# Patient Record
Sex: MALE | Race: BLACK OR AFRICAN AMERICAN | Employment: FULL TIME | URBAN - METROPOLITAN AREA
[De-identification: names, ages, dates, MRNs, and addresses within clinical notes are randomized per-mention and may not be internally consistent; named-entity substitution may affect disease eponyms.]

---

## 2023-06-01 ENCOUNTER — HOSPITAL ENCOUNTER (INPATIENT)
Age: 53
LOS: 1 days | Discharge: HOME | End: 2023-06-03
Attending: EMERGENCY MEDICINE | Admitting: PSYCHIATRY & NEUROLOGY

## 2023-06-01 DIAGNOSIS — R42 LIGHTHEADEDNESS: ICD-10-CM

## 2023-06-01 DIAGNOSIS — G81.94 ACUTE LEFT HEMIPARESIS (HCC): ICD-10-CM

## 2023-06-01 DIAGNOSIS — R53.1 WEAKNESS OF LEFT SIDE OF BODY: Primary | ICD-10-CM

## 2023-06-01 DIAGNOSIS — I63.50 RIGHT PONTINE STROKE (HCC): ICD-10-CM

## 2023-06-01 LAB — TROPONIN I SERPL HS-MCNC: 8 NG/L (ref 0–22)

## 2023-06-01 PROCEDURE — 99285 EMERGENCY DEPT VISIT HI MDM: CPT

## 2023-06-01 PROCEDURE — 93005 ELECTROCARDIOGRAM TRACING: CPT | Performed by: PSYCHIATRY & NEUROLOGY

## 2023-06-01 PROCEDURE — 84484 ASSAY OF TROPONIN QUANT: CPT

## 2023-06-01 ASSESSMENT — PAIN - FUNCTIONAL ASSESSMENT: PAIN_FUNCTIONAL_ASSESSMENT: NONE - DENIES PAIN

## 2023-06-02 ENCOUNTER — APPOINTMENT (OUTPATIENT)
Dept: MRI IMAGING | Age: 53
End: 2023-06-02

## 2023-06-02 PROBLEM — R53.1 WEAKNESS OF LEFT SIDE OF BODY: Status: ACTIVE | Noted: 2023-06-02

## 2023-06-02 PROBLEM — I63.9 STROKE DETERMINED BY CLINICAL ASSESSMENT (HCC): Status: ACTIVE | Noted: 2023-06-02

## 2023-06-02 PROBLEM — I63.50 RIGHT PONTINE STROKE (HCC): Status: ACTIVE | Noted: 2023-06-02

## 2023-06-02 PROBLEM — G81.94 ACUTE LEFT HEMIPARESIS (HCC): Status: ACTIVE | Noted: 2023-06-02

## 2023-06-02 LAB
ANION GAP SERPL CALCULATED.3IONS-SCNC: 10 MMOL/L (ref 9–17)
BUN SERPL-MCNC: 9 MG/DL (ref 6–20)
CALCIUM SERPL-MCNC: 9 MG/DL (ref 8.6–10.4)
CHLORIDE SERPL-SCNC: 102 MMOL/L (ref 98–107)
CO2 SERPL-SCNC: 22 MMOL/L (ref 20–31)
CREAT SERPL-MCNC: 0.91 MG/DL (ref 0.7–1.2)
EKG ATRIAL RATE: 50 BPM
EKG P AXIS: 36 DEGREES
EKG P-R INTERVAL: 148 MS
EKG Q-T INTERVAL: 494 MS
EKG QRS DURATION: 106 MS
EKG QTC CALCULATION (BAZETT): 450 MS
EKG R AXIS: 11 DEGREES
EKG T AXIS: 25 DEGREES
EKG VENTRICULAR RATE: 50 BPM
ERYTHROCYTE [DISTWIDTH] IN BLOOD BY AUTOMATED COUNT: 13.1 % (ref 11.8–14.4)
EST. AVERAGE GLUCOSE BLD GHB EST-MCNC: 117 MG/DL
GFR SERPL CREATININE-BSD FRML MDRD: >60 ML/MIN/1.73M2
GLUCOSE SERPL-MCNC: 92 MG/DL (ref 70–99)
HBA1C MFR BLD: 5.7 % (ref 4–6)
HCT VFR BLD AUTO: 42.8 % (ref 40.7–50.3)
HGB BLD-MCNC: 14.1 G/DL (ref 13–17)
MCH RBC QN AUTO: 30.5 PG (ref 25.2–33.5)
MCHC RBC AUTO-ENTMCNC: 32.9 G/DL (ref 28.4–34.8)
MCV RBC AUTO: 92.4 FL (ref 82.6–102.9)
NRBC AUTOMATED: 0 PER 100 WBC
PLATELET # BLD AUTO: 227 K/UL (ref 138–453)
PMV BLD AUTO: 10.4 FL (ref 8.1–13.5)
POTASSIUM SERPL-SCNC: 3.9 MMOL/L (ref 3.7–5.3)
RBC # BLD AUTO: 4.63 M/UL (ref 4.21–5.77)
SODIUM SERPL-SCNC: 134 MMOL/L (ref 135–144)
TSH SERPL-MCNC: 1.6 UIU/ML (ref 0.3–5)
WBC OTHER # BLD: 7.8 K/UL (ref 3.5–11.3)

## 2023-06-02 PROCEDURE — 6370000000 HC RX 637 (ALT 250 FOR IP)

## 2023-06-02 PROCEDURE — 97166 OT EVAL MOD COMPLEX 45 MIN: CPT

## 2023-06-02 PROCEDURE — 92523 SPEECH SOUND LANG COMPREHEN: CPT

## 2023-06-02 PROCEDURE — 85027 COMPLETE CBC AUTOMATED: CPT

## 2023-06-02 PROCEDURE — 6370000000 HC RX 637 (ALT 250 FOR IP): Performed by: STUDENT IN AN ORGANIZED HEALTH CARE EDUCATION/TRAINING PROGRAM

## 2023-06-02 PROCEDURE — 80048 BASIC METABOLIC PNL TOTAL CA: CPT

## 2023-06-02 PROCEDURE — 93010 ELECTROCARDIOGRAM REPORT: CPT | Performed by: INTERNAL MEDICINE

## 2023-06-02 PROCEDURE — 99223 1ST HOSP IP/OBS HIGH 75: CPT | Performed by: PSYCHIATRY & NEUROLOGY

## 2023-06-02 PROCEDURE — 83036 HEMOGLOBIN GLYCOSYLATED A1C: CPT

## 2023-06-02 PROCEDURE — 84443 ASSAY THYROID STIM HORMONE: CPT

## 2023-06-02 PROCEDURE — 97162 PT EVAL MOD COMPLEX 30 MIN: CPT

## 2023-06-02 PROCEDURE — 36415 COLL VENOUS BLD VENIPUNCTURE: CPT

## 2023-06-02 PROCEDURE — 2060000000 HC ICU INTERMEDIATE R&B

## 2023-06-02 PROCEDURE — 94761 N-INVAS EAR/PLS OXIMETRY MLT: CPT

## 2023-06-02 PROCEDURE — 70551 MRI BRAIN STEM W/O DYE: CPT

## 2023-06-02 PROCEDURE — 97116 GAIT TRAINING THERAPY: CPT

## 2023-06-02 PROCEDURE — 97535 SELF CARE MNGMENT TRAINING: CPT

## 2023-06-02 PROCEDURE — 6360000002 HC RX W HCPCS: Performed by: STUDENT IN AN ORGANIZED HEALTH CARE EDUCATION/TRAINING PROGRAM

## 2023-06-02 PROCEDURE — 99254 IP/OBS CNSLTJ NEW/EST MOD 60: CPT | Performed by: STUDENT IN AN ORGANIZED HEALTH CARE EDUCATION/TRAINING PROGRAM

## 2023-06-02 RX ORDER — CLOPIDOGREL BISULFATE 75 MG/1
75 TABLET ORAL DAILY
Status: DISCONTINUED | OUTPATIENT
Start: 2023-06-03 | End: 2023-06-03 | Stop reason: HOSPADM

## 2023-06-02 RX ORDER — ENOXAPARIN SODIUM 100 MG/ML
40 INJECTION SUBCUTANEOUS DAILY
Status: DISCONTINUED | OUTPATIENT
Start: 2023-06-02 | End: 2023-06-03 | Stop reason: HOSPADM

## 2023-06-02 RX ORDER — ASPIRIN 300 MG/1
300 SUPPOSITORY RECTAL DAILY
Status: DISCONTINUED | OUTPATIENT
Start: 2023-06-02 | End: 2023-06-03 | Stop reason: HOSPADM

## 2023-06-02 RX ORDER — ONDANSETRON 4 MG/1
4 TABLET, ORALLY DISINTEGRATING ORAL EVERY 8 HOURS PRN
Status: DISCONTINUED | OUTPATIENT
Start: 2023-06-02 | End: 2023-06-03 | Stop reason: HOSPADM

## 2023-06-02 RX ORDER — LABETALOL HYDROCHLORIDE 5 MG/ML
10 INJECTION, SOLUTION INTRAVENOUS EVERY 10 MIN PRN
Status: DISCONTINUED | OUTPATIENT
Start: 2023-06-02 | End: 2023-06-03 | Stop reason: HOSPADM

## 2023-06-02 RX ORDER — ONDANSETRON 2 MG/ML
4 INJECTION INTRAMUSCULAR; INTRAVENOUS EVERY 6 HOURS PRN
Status: DISCONTINUED | OUTPATIENT
Start: 2023-06-02 | End: 2023-06-03 | Stop reason: HOSPADM

## 2023-06-02 RX ORDER — POLYETHYLENE GLYCOL 3350 17 G/17G
17 POWDER, FOR SOLUTION ORAL DAILY PRN
Status: DISCONTINUED | OUTPATIENT
Start: 2023-06-02 | End: 2023-06-03 | Stop reason: HOSPADM

## 2023-06-02 RX ORDER — ASPIRIN 81 MG/1
81 TABLET ORAL DAILY
Status: DISCONTINUED | OUTPATIENT
Start: 2023-06-02 | End: 2023-06-03 | Stop reason: HOSPADM

## 2023-06-02 RX ORDER — ATORVASTATIN CALCIUM 80 MG/1
80 TABLET, FILM COATED ORAL NIGHTLY
Status: DISCONTINUED | OUTPATIENT
Start: 2023-06-02 | End: 2023-06-03 | Stop reason: HOSPADM

## 2023-06-02 RX ORDER — CLOPIDOGREL 300 MG/1
300 TABLET, FILM COATED ORAL ONCE
Status: COMPLETED | OUTPATIENT
Start: 2023-06-02 | End: 2023-06-02

## 2023-06-02 RX ADMIN — CLOPIDOGREL BISULFATE 300 MG: 300 TABLET, FILM COATED ORAL at 11:43

## 2023-06-02 RX ADMIN — ATORVASTATIN CALCIUM 80 MG: 80 TABLET, FILM COATED ORAL at 01:29

## 2023-06-02 RX ADMIN — Medication 81 MG: at 10:31

## 2023-06-02 RX ADMIN — ATORVASTATIN CALCIUM 80 MG: 80 TABLET, FILM COATED ORAL at 20:55

## 2023-06-02 RX ADMIN — ENOXAPARIN SODIUM 40 MG: 40 INJECTION SUBCUTANEOUS at 10:32

## 2023-06-02 ASSESSMENT — ENCOUNTER SYMPTOMS
DOUBLE VISION: 0
ABDOMINAL PAIN: 0
BLURRED VISION: 0
SHORTNESS OF BREATH: 0

## 2023-06-02 NOTE — ED PROVIDER NOTES
Faculty Sign-Out Attestation  Handoff taken on the following patient from prior Attending Physician: Camilo Miller was available and discussed any additional care issues that arose and coordinated the management plans with the resident(s) caring for the patient during my duty period. Any areas of disagreement with residents documentation of care or procedures are noted on the chart. I was personally present for the key portions of any/all procedures during my duty period. I have documented in the chart those procedures where I was not present during the key portions.     Stable ct / cta at [address] [facility]  > neuro at bedside,   Stroke consult, lkw 1230,   Will admit    Tom Gary DO  Attending Physician       Tom Gary DO  06/01/23 1148
[facility]     Emergency Department     Faculty Note/ Attestation      Pt Name: Ike Garcia                                       MRN: [id number]  [place] 1970  Date of evaluation: 6/1/2023    Patients PCP:    No primary care provider on file. Attestation  I performed a history and physical examination of the patient and discussed management with the resident. I reviewed the residents note and agree with the documented findings and plan of care. Any areas of disagreement are noted on the chart. I was personally present for the key portions of any procedures. I have documented in the chart those procedures where I was not present during the key portions. I have reviewed the emergency nurses triage note. I agree with the chief complaint, past medical history, past surgical history, allergies, medications, social and family history as documented unless otherwise noted below. For Physician Assistant/ Nurse Practitioner cases/documentation I have personally evaluated this patient and have completed at least one if not all key elements of the E/M (history, physical exam, and MDM). Additional findings are as noted.       Initial Screens:        Jovita Coma Scale  Eye Opening: Spontaneous  Best Verbal Response: Oriented  Best Motor Response: Obeys commands  Ionia Coma Scale Score: 15    Vitals:    Vitals:    06/01/23 2236   BP: (!) 193/113   Pulse: 54   Resp: 19   Temp: 97.6 °F (36.4 °C)   TempSrc: Oral   SpO2: 95%   Weight: 190 lb (86.2 kg)   Height: 5' 10\" (1.778 m)       CHIEF COMPLAINT       Chief Complaint   Patient presents with    Dizziness    Hypertension             DIAGNOSTIC RESULTS             RADIOLOGY:   No orders to display         LABS:  Labs Reviewed   TROPONIN         EMERGENCY DEPARTMENT COURSE:     -------------------------  BP: (!) 193/113, Temp: 97.6 °F (36.4 °C), Pulse: 54, Respirations: 19      Comments    LKW 1230 today  Tsf from OSH  Lt headed, L sided
hit bed  6b. Motor right le - no drift; leg holds 30 degree position for full 5 seconds  7. Limb Ataxia:  1 - present in one limb  8. Sensory:  0 - normal; no sensory loss  9. Best Language:  0 - no aphasia, normal  10. Dysarthria:  0 - normal  11. Extinction and Inattention:  0 - no abnormality    TOTAL:  3       PROCEDURES:  None    CONSULTS:  IP CONSULT TO STROKE TEAM  IP CONSULT TO PHYSICAL MEDICINE REHAB      FINAL IMPRESSION      1. Weakness of left side of body    2. Lightheadedness          DISPOSITION / PLAN     DISPOSITION Admitted 2023 12:17:10 AM      PATIENT REFERRED TO:  No follow-up provider specified.     DISCHARGE MEDICATIONS:  New Prescriptions    No medications on file       John aErly MD  Emergency Medicine Resident    (Please note that portions of thisnote were completed with a voice recognition program.  Efforts were made to edit the dictations but occasionally words are mis-transcribed.)       Mellissa Cage MD  Resident  [phone]

## 2023-06-02 NOTE — CARE COORDINATION
Case Management Assessment  Initial Evaluation    Date/Time of Evaluation: 6/2/2023 11:57 AM  Assessment Completed by: Moi Leon RN    If patient is discharged prior to next notation, then this note serves as note for discharge by case management. Patient Name: Farrah Purvis                   YOB: 1970  Diagnosis: Lightheadedness [R42]  Weakness of left side of body [R53.1]  Stroke determined by clinical assessment [facility]) [I63.9]                   Date / Time: 6/1/2023 10:35 PM    Patient Admission Status: Inpatient   Readmission Risk (Low < 19, Mod (19-27), High > 27): Readmission Risk Score: 6.7    Current PCP: No primary care provider on file. PCP verified by CM? No (pt does not have PCP, states he will look for one back home in [place].)    Chart Reviewed: Yes      History Provided by: Patient  Patient Orientation: Alert and Oriented    Patient Cognition: Alert    Hospitalization in the last 30 days (Readmission):  No    If yes, Readmission Assessment in CM Navigator will be completed. Advance Directives:      Code Status: Full Code   Patient's Primary Decision Maker is: Legal Next of Kin      Discharge Planning:    Patient lives with: (P) Spouse/Significant Other Type of Home: (P) House  Primary Care Giver: Self  Patient Support Systems include: Spouse/Significant Other   Current Financial resources:    Current community resources:    Current services prior to admission: (P) None            Current DME:              Type of Home Care services:  (P) None    ADLS  Prior functional level: Independent in ADLs/IADLs  Current functional level: Assistance with the following:, Mobility    PT AM-PAC:   /24  OT AM-PAC:   /24    Family can provide assistance at DC: Yes  Would you like Case Management to discuss the discharge plan with any other family members/significant others, and if so, who?  No  Plans to Return to Present Housing: Yes  Other Identified Issues/Barriers to RETURNING to current housing:

## 2023-06-02 NOTE — CARE COORDINATION
06/02/23 0731   Readmission Assessment   Number of Days since last admission? 1-7 days  (transfer from [facility], not a readmit)   Previous Disposition Other (comment)  (transfer from [facility], not a readmit)   Who is being Interviewed Patient  (transfer from [facility], not a readmit)   What was the patient's/caregiver's perception as to why they think they needed to return back to the hospital? Other (Comment)  (transfer from [facility], not a readmit)   Did you visit your Primary Care Physician after you left the hospital, before you returned this time? No  (transfer from [facility], not a readmit)   Why weren't you able to visit your PCP? Other (Comment)  (transfer from [facility], not a readmit)   Did you see a specialist, such as Cardiac, Pulmonary, Orthopedic Physician, etc. after you left the hospital? Other (Comment)  (transfer from [facility], not a readmit)   Who advised the patient to return to the hospital? Other (Comment)  (transfer from [facility], not a readmit)   Does the patient report anything that got in the way of taking their medications? No  (transfer from [facility], not a readmit)   In our efforts to provide the best possible care to you and others like you, can you think of anything that we could have done to help you after you left the hospital the first time, so that you might not have needed to return so soon?  Other (Comment)  (transfer from [facility], not a readmit)

## 2023-06-02 NOTE — CONSULTS
Department of Neurology/Telestroke/Stroke  Resident Consult Note  Stroke Alert @ 11:00 pm  Arrival at bedside @ 11:02 pm    Reason for Consult:  ED Stroke Consult  Requesting Physician:  ED   Endovascular Neurosurgeon:     Stroke Team:  Franca Velásquez MD      History Obtained From:  patient, electronic medical record    CHIEF COMPLAINT:       Sudden onset dizziness and left sided weakness while driving     HISTORY OF PRESENT ILLNESS:       The patient is a 46 y.o. male  with H/O smoking (20 years, half a pack per day, stopped for 3 years and then resumed smoking last year), chronic HTN with medication non-compliance, experienced a sudden onset dizziness and left upper and lower extremity weakness while driving. He had to pull over because his dizziness was severe. He was evaluated via tele stroke with NIHSS of 5. CTH and CTA at outlying facility were reported as unremarkable. He was loaded with asa 325 mg then transferred here for stroke workup. LKW: 12.30 pm 6/1/2023  NIHSS: 5   Modified Sequoyah Scale: 0  CT head without contrast: unremarkable  TNK: not a candidate due to outside window   Endovascular: No LVO on CTA      PAST MEDICAL HISTORY :       Past Medical History:    No past medical history on file. Past Surgical History:    No past surgical history on file.     Social History:   Social History     Socioeconomic History    Marital status: Not on file     Spouse name: Not on file    Number of children: Not on file    Years of education: Not on file    Highest education level: Not on file   Occupational History    Not on file   Tobacco Use    Smoking status: Not on file    Smokeless tobacco: Not on file   Substance and Sexual Activity    Alcohol use: Not on file    Drug use: Not on file    Sexual activity: Not on file   Other Topics Concern    Not on file   Social History Narrative    Not on file     Social Determinants of Health     Financial Resource Strain: Not on file   Food Insecurity: Not
file.    Social History:  Lives With: Spouse  Type of Home: House  Home Layout: One level  Home Access: Stairs to enter with rails  Entrance Stairs - Number of Steps: 4  Entrance Stairs - Rails: Both  Bathroom Shower/Tub: Tub/Shower unit  Bathroom Toilet: Standard  Bathroom Accessibility: Accessible  Home Equipment: None  Has the patient had two or more falls in the past year or any fall with injury in the past year?: No  Receives Help From: Family  ADL Assistance: [address]: Independent  Homemaking Responsibilities: Yes  Ambulation Assistance: Independent  Transfer Assistance: Independent  Active : Yes  Mode of Transportation: Truck  Occupation: Full time employment  Type of Occupation:   Leisure & [address]: \"hanging out\"  Additional Comments: Pt has supportive wife at d/c  Social History     Socioeconomic History    Marital status:        Physical Exam:  BP (!) 166/96   Pulse 54   Temp 97.6 °F (36.4 °C) (Oral)   Resp 13   Ht 5' 11\" (1.803 m)   Wt 208 lb 15.9 oz (94.8 kg)   SpO2 96%   BMI 29.15 kg/m²     GEN: Well developed, well nourished, no acute distress  HEENT: NCAT. EOM grossly intact. Hearing grossly intact. Mucous membranes pink and moist.  RESP: Normal breath sounds with no wheezing, rales, or rhonchi. Respirations WNL and unlabored. CV: Regular rate and rhythm. No murmurs, rubs, or gallops. ABD: Soft, non-distended, BS+ and equal.  NEURO: Alert. Speech fluent. Sensation to light touch intact. MSK: Muscle tone and bulk are normal bilaterally. Strength 5/5 in the right upper and lower limbs. Has at least antigravity strength with left elbow flexion; strength 2/5 with left . Has at least antigravity strength with left knee extension; no movement noted with left ankle dorsiflexion. LIMBS: No edema in bilateral lower limbs. SKIN: Warm and dry with good turgor. PSYCH: Mood WNL. Affect WNL. Appropriately interactive.     Diagnostics:    CBC:

## 2023-06-02 NOTE — H&P
[id number] [facility]   [address] [place] [address]    HISTORY AND PHYSICAL EXAMINATION            Date:   6/2/2023  Patient name:  Matt Archibald  Date of admission:  6/1/2023 10:35 PM  MRN:   [id number]  Account:  [de-identified]  YOB: 1970  PCP:    No primary care provider on file. Room:   [facility]  Code Status:    Full Code    Chief Complaint:     Chief Complaint   Patient presents with    Dizziness    Hypertension    Extremity Weakness     Left sided weakness       History Obtained From:     patient, electronic medical record    History of Present Illness: The patient is a 46 y.o. male  with H/O smoking (20 years, half a pack per day, stopped for 3 years and then resumed smoking last year), chronic HTN with medication non-compliance, experienced a sudden onset dizziness and left upper and lower extremity weakness while driving. He had to pull over because his dizziness was severe. He was evaluated via tele stroke with NIHSS of 5. CTH and CTA at outlying facility were reported as unremarkable. He was loaded with asa 325 mg then transferred here for stroke workup. LKW: 12.30 pm 6/1/2023  NIHSS: 5   Modified Melvin Scale: 0  CT head without contrast: unremarkable  TNK: not a candidate due to outside window   Endovascular: No LVO on CTA     Past Medical History:     No past medical history on file. Past Surgical History:     No past surgical history on file. Medications Prior to Admission:     Prior to Admission medications    Not on File        Allergies:     Patient has no allergy information on record. Social History:     Tobacco:    has no history on file for tobacco use. Alcohol:      has no history on file for alcohol use. Drug Use:  has no history on file for drug use. Family History:     No family history on file.     Review of Systems:     ROS:  Constitutional  Negative for fever and chills    HEENT  Negative for ear discharge, ear pain,

## 2023-06-02 NOTE — ED NOTES
60-year-old male hypertension history presenting with left facial droop some left-sided weakness. 85.  Last known well noon today. Dr. Ced Arnett with neurology aware recommending ED to ED transfer. Stroke alert noncritical.  CTA may be performed prior to transfer.   Accepted by Dr Gadiel Sanchez, RN  06/01/23 5184

## 2023-06-02 NOTE — PLAN OF CARE
Problem: Discharge Planning  Goal: Discharge to home or other facility with appropriate resources  6/2/2023 1220 by Rahul Garcia RN  Outcome: Progressing  6/2/2023 0616 by Myla Schulz RN  Outcome: Progressing     Problem: Safety - Adult  Goal: Free from fall injury  6/2/2023 1220 by Rahul Garcia RN  Outcome: Progressing  6/2/2023 0616 by Myla Schulz RN  Outcome: Progressing

## 2023-06-02 NOTE — ED TRIAGE NOTES
Pt a transfer from [place] with c/o of dizziness and left sided weakness. Pt states his last known well is 12 noon. Pt states he is a  going to [facility] when he felt dizzy. Pt denies chest pain and SOB. Pt also states he has Left sided weakness on LUE and LLE. Pt states he has HTN and is on amlodipine 10 mg, but is not taking his medicine regularly. Pt took amlodipine when he started to feel dizzy.

## 2023-06-03 VITALS
DIASTOLIC BLOOD PRESSURE: 98 MMHG | HEART RATE: 50 BPM | OXYGEN SATURATION: 94 % | HEIGHT: 71 IN | BODY MASS INDEX: 29.26 KG/M2 | WEIGHT: 209 LBS | RESPIRATION RATE: 12 BRPM | SYSTOLIC BLOOD PRESSURE: 159 MMHG | TEMPERATURE: 97.7 F

## 2023-06-03 LAB
ANION GAP SERPL CALCULATED.3IONS-SCNC: 11 MMOL/L (ref 9–17)
BUN SERPL-MCNC: 14 MG/DL (ref 6–20)
CALCIUM SERPL-MCNC: 9 MG/DL (ref 8.6–10.4)
CHLORIDE SERPL-SCNC: 104 MMOL/L (ref 98–107)
CHOLEST SERPL-MCNC: 186 MG/DL
CHOLESTEROL/HDL RATIO: 4.5
CO2 SERPL-SCNC: 22 MMOL/L (ref 20–31)
CREAT SERPL-MCNC: 1 MG/DL (ref 0.7–1.2)
ERYTHROCYTE [DISTWIDTH] IN BLOOD BY AUTOMATED COUNT: 13.1 % (ref 11.8–14.4)
GFR SERPL CREATININE-BSD FRML MDRD: >60 ML/MIN/1.73M2
GLUCOSE SERPL-MCNC: 89 MG/DL (ref 70–99)
HCT VFR BLD AUTO: 44 % (ref 40.7–50.3)
HDLC SERPL-MCNC: 41 MG/DL
HGB BLD-MCNC: 14.4 G/DL (ref 13–17)
LDLC SERPL CALC-MCNC: 131 MG/DL (ref 0–130)
LV EF: 55 %
LVEF MODALITY: NORMAL
MCH RBC QN AUTO: 30.5 PG (ref 25.2–33.5)
MCHC RBC AUTO-ENTMCNC: 32.7 G/DL (ref 28.4–34.8)
MCV RBC AUTO: 93.2 FL (ref 82.6–102.9)
NRBC AUTOMATED: 0 PER 100 WBC
PLATELET # BLD AUTO: 228 K/UL (ref 138–453)
PMV BLD AUTO: 10.3 FL (ref 8.1–13.5)
POTASSIUM SERPL-SCNC: 3.9 MMOL/L (ref 3.7–5.3)
RBC # BLD AUTO: 4.72 M/UL (ref 4.21–5.77)
SODIUM SERPL-SCNC: 137 MMOL/L (ref 135–144)
TRIGL SERPL-MCNC: 71 MG/DL
WBC OTHER # BLD: 7.5 K/UL (ref 3.5–11.3)

## 2023-06-03 PROCEDURE — 85027 COMPLETE CBC AUTOMATED: CPT

## 2023-06-03 PROCEDURE — 93306 TTE W/DOPPLER COMPLETE: CPT

## 2023-06-03 PROCEDURE — 6370000000 HC RX 637 (ALT 250 FOR IP)

## 2023-06-03 PROCEDURE — 99239 HOSP IP/OBS DSCHRG MGMT >30: CPT | Performed by: PSYCHIATRY & NEUROLOGY

## 2023-06-03 PROCEDURE — 6370000000 HC RX 637 (ALT 250 FOR IP): Performed by: STUDENT IN AN ORGANIZED HEALTH CARE EDUCATION/TRAINING PROGRAM

## 2023-06-03 PROCEDURE — 80048 BASIC METABOLIC PNL TOTAL CA: CPT

## 2023-06-03 PROCEDURE — 36415 COLL VENOUS BLD VENIPUNCTURE: CPT

## 2023-06-03 PROCEDURE — 80061 LIPID PANEL: CPT

## 2023-06-03 PROCEDURE — 6360000002 HC RX W HCPCS: Performed by: STUDENT IN AN ORGANIZED HEALTH CARE EDUCATION/TRAINING PROGRAM

## 2023-06-03 RX ORDER — CLOPIDOGREL BISULFATE 75 MG/1
75 TABLET ORAL DAILY
Qty: 90 TABLET | Refills: 0 | Status: SHIPPED | OUTPATIENT
Start: 2023-06-04 | End: 2023-09-02

## 2023-06-03 RX ORDER — AMLODIPINE BESYLATE 5 MG/1
5 TABLET ORAL DAILY
Qty: 30 TABLET | Refills: 3 | Status: SHIPPED | OUTPATIENT
Start: 2023-06-03 | End: 2023-06-03 | Stop reason: SDUPTHER

## 2023-06-03 RX ORDER — AMLODIPINE BESYLATE 10 MG/1
10 TABLET ORAL DAILY
Status: DISCONTINUED | OUTPATIENT
Start: 2023-06-03 | End: 2023-06-03

## 2023-06-03 RX ORDER — CLOPIDOGREL BISULFATE 75 MG/1
75 TABLET ORAL DAILY
Qty: 20 TABLET | Refills: 0 | Status: SHIPPED | OUTPATIENT
Start: 2023-06-04 | End: 2023-06-03 | Stop reason: SDUPTHER

## 2023-06-03 RX ORDER — ATORVASTATIN CALCIUM 80 MG/1
80 TABLET, FILM COATED ORAL NIGHTLY
Qty: 30 TABLET | Refills: 3 | Status: SHIPPED | OUTPATIENT
Start: 2023-06-03

## 2023-06-03 RX ORDER — AMLODIPINE BESYLATE 10 MG/1
10 TABLET ORAL DAILY
Qty: 30 TABLET | Refills: 3 | Status: SHIPPED | OUTPATIENT
Start: 2023-06-03 | End: 2023-06-03 | Stop reason: SDUPTHER

## 2023-06-03 RX ORDER — AMLODIPINE BESYLATE 5 MG/1
5 TABLET ORAL DAILY
Status: DISCONTINUED | OUTPATIENT
Start: 2023-06-03 | End: 2023-06-03 | Stop reason: HOSPADM

## 2023-06-03 RX ORDER — AMLODIPINE BESYLATE 5 MG/1
5 TABLET ORAL DAILY
Qty: 30 TABLET | Refills: 3 | Status: SHIPPED | OUTPATIENT
Start: 2023-06-03

## 2023-06-03 RX ORDER — AMLODIPINE BESYLATE 10 MG/1
5 TABLET ORAL DAILY
Qty: 30 TABLET | Refills: 3 | Status: SHIPPED | OUTPATIENT
Start: 2023-06-03 | End: 2023-06-03 | Stop reason: HOSPADM

## 2023-06-03 RX ORDER — ASPIRIN 81 MG/1
81 TABLET ORAL DAILY
Qty: 30 TABLET | Refills: 5 | Status: SHIPPED | OUTPATIENT
Start: 2023-06-04

## 2023-06-03 RX ADMIN — Medication 81 MG: at 09:40

## 2023-06-03 RX ADMIN — ENOXAPARIN SODIUM 40 MG: 40 INJECTION SUBCUTANEOUS at 09:40

## 2023-06-03 RX ADMIN — CLOPIDOGREL BISULFATE 75 MG: 75 TABLET, FILM COATED ORAL at 09:40

## 2023-06-03 ASSESSMENT — ENCOUNTER SYMPTOMS
WHEEZING: 0
SHORTNESS OF BREATH: 0
ABDOMINAL PAIN: 0

## 2023-06-03 NOTE — PROGRESS NOTES
[facility] [address]     Emergency/Trauma Note    PATIENT NAME: Teddy Kenney    Shift date: 06/01/23  Shift day: Thursday   Shift # 2    Room # 26/26   Name: Teddy Kenney            Age: 46 y.o. Gender: male          Rastafarian: None   Place of Mormon:     Trauma/Incident type:  Stroke Consult  Admit Date & Time: 6/1/2023 10:35 PM  TRAUMA NAME: N/A    ADVANCE DIRECTIVES IN CHART? No    NAME OF DECISION MAKER: N/A    RELATIONSHIP OF DECISION MAKER TO PATIENT: N/A    PATIENT/EVENT DESCRIPTION:  Teddy Kenney is a 46 y.o. male who arrived at [de-identified].  received perfect serve for stroke consult. Pt to be admitted to 26/26. SPIRITUAL ASSESSMENT-INTERVENTION-OUTCOME:  Writer introduced self as . Patient did not appear to mind  presence and engaged in conversation. Patient appeared calm and coping when conversing with .  provided a supportive presence through active listening and words of affirmation. PATIENT BELONGINGS:   writing did not handle patient belongings    ANY BELONGINGS OF SIGNIFICANT VALUE NOTED:  N/A    REGISTRATION STAFF NOTIFIED? Yes      WHAT IS YOUR SPIRITUAL CARE PLAN FOR THIS PATIENT?:   Chaplains will remain available to offer spiritual and emotional support as needed.     Electronically signed by Lashell Orosco on 6/1/2023 at 11:58 PM.  Riley Manuel  [phone]
Advance Care Planning     Advance Care Planning Inpatient Note  Spiritual Care Department    Today's Date: 6/2/2023  Unit: STV[facility] 1C STEPDOWN    Received request from [employer]. Upon review of chart and communication with care team, patient's decision making abilities are not in question. . Patient was/were present in the room during visit. Goals of ACP Conversation:  Discuss advance care planning documents    Health Care Decision Makers:     Mono Flowers (spouse) [phone]    Advance Care Planning Documents (Patient Wishes):  None     Assessment: This writer met w/ pt in pt's room. This writer provided information about [address]. Patient stated that he would like to speak with his spouse about [address] documents before completing. This writer left blank ACP packet with patient in pt's room. Pt stated that his spouse would be coming in to hospital later on this date. Interventions:  Provided education on documents for clarity and greater understanding  Discussed and provided education on state decision maker hierarchy    Care Preferences Communicated:   No    Outcomes/Plan:  ACP Discussion: Completed    Electronically signed by Ashley Mariscal on 6/2/2023 at 11:06 AM         06/02/23 1052   Encounter Summary   Service Provided For: Patient   Referral/Consult From: Nurse   Support System Spouse; Family members   Last Encounter  06/02/23   Complexity of Encounter Low   Begin Time 1035   End Time  1045   Total Time Calculated 10 min   Spiritual/Emotional needs   Type Spiritual Support   Advance Care Planning   Type ACP conversation   Assessment/Intervention/Outcome   Assessment Calm;Coping   Intervention Sustaining Presence/Ministry of presence; Active listening;Prayer (assurance of)/[place]   Outcome Engaged in conversation;Coping;Expressed Gratitude
Echo completed in the Echo Lab.
NEUROLOGY INPATIENT PROGRESS NOTE    6/3/2023         Subjective:   Alexander Dumont is a  46 y.o. male admitted on 6/1/2023 with Lightheadedness [R42]  Weakness of left side of body [R53.1]  Stroke determined by clinical assessment ([facility] [address] [I63.9]    Interval History:  Briefly, this is a  46 y.o. male admitted on 6/1/2023 with concern for stroke. Patient is a  with H/O smoking (20 years, half a pack per day, stopped for 3 years and then resumed smoking last year), chronic HTN with medication non-compliance, experienced a sudden onset dizziness and left upper and lower extremity weakness while driving. He had to pull over because his dizziness was severe. He was evaluated via tele stroke with NIHSS of 5. CTH and CTA at outlying facility were reported as unremarkable. He was loaded with asa 325 mg then transferred here for stroke workup. LKW: 12.30 pm 6/1/2023  NIHSS: 5   Modified Melvin Scale: 0  CT head without contrast: unremarkable  TNK: not a candidate due to outside window   Endovascular: No LVO on CTA   MRI:  Acute lacunar infarct involving right paramedian akash    Today,    No acute events overnight. BP on higher side- goal do not treat unless SBP>220. Patient is undergoing Echo this morning. He reported doing well. - He is ambulating in the room. - Patient alert,oriented x4.   - Patient worked with OT/PT. PM&R was consulted and recommended acute inpatient rehab once medically stable. Patient and his wife wants to go home today to [place]. Patient would like to  continue therapy after going home. ROS:    Review of Systems   Respiratory:  Negative for shortness of breath and wheezing. Cardiovascular:  Negative for chest pain, palpitations and leg swelling. Gastrointestinal:  Negative for abdominal pain. Neurological:  Positive for weakness and numbness. Negative for seizures and syncope. Psychiatric/Behavioral:  Negative for agitation, behavioral problems and confusion.
Occupational Therapy  [facility] [address] [facility]  Occupational Therapy Initial Assessment    Name: Teddy Kenney  : 1970  MRN: [id number]  Date of Service: 2023    Chief Complaint   Patient presents with    Dizziness    Hypertension    Extremity Weakness       Left sided weakness       Discharge Recommendations:  Patient would benefit from continued therapy after discharge  OT Equipment Recommendations  Equipment Needed: Yes  Mobility Devices: ADL Assistive Devices  ADL Assistive Devices: Transfer Tub Bench;Long-handled Sponge;Reacher    Further therapy recommended at discharge. The patient should be able to tolerate at least 3 hours of therapy per day over 5 days or 15 hours over 7 days. This patient may benefit from a Physical Medicine and Rehab consult. Patient Diagnosis(es): The primary encounter diagnosis was Weakness of left side of body. A diagnosis of Lightheadedness was also pertinent to this visit. Past Medical History:  has no past medical history on file. Past Surgical History:  has no past surgical history on file. Assessment   Performance deficits / Impairments: Decreased functional mobility ; Decreased ADL status; Decreased ROM; Decreased strength;Decreased fine motor control;Decreased balance;Decreased coordination;Decreased high-level IADLs  Prognosis: Good  Decision Making: Medium Complexity  REQUIRES OT FOLLOW-UP: Yes  Activity Tolerance  Activity Tolerance: Patient Tolerated treatment well        Plan   Occupational Therapy Plan  Times Per Week: 3-5x/week  Current Treatment Recommendations: ROM, Strengthening, Balance training, Functional mobility training, Safety education & training, Self-Care / ADL, Coordination training, Neuromuscular re-education, Endurance training, Equipment evaluation, education, & procurement, Patient/Caregiver education & training     Restrictions  Restrictions/Precautions  Restrictions/Precautions: Up as Tolerated, Fall Risk  Required
SLP ALL NOTES  Facility/Department: [address] STE[address]  Initial Speech/Language/Cognitive Assessment    NAME: Sanjuana Rico  : 1970   MRN: [id number]  ADMISSION DATE: 2023  ADMITTING DIAGNOSIS: has Right pontine stroke ([facility] [address] and Acute left hemiparesis ([address] on their problem list.      Date of Eval: 2023   Evaluating Therapist: Ginny Corrales    Primary Complaint: The patient is a 46 y.o. male  with H/O smoking (20 years, half a pack per day, stopped for 3 years and then resumed smoking last year), chronic HTN with medication non-compliance, experienced a sudden onset dizziness and left upper and lower extremity weakness while driving. He had to pull over because his dizziness was severe. He was evaluated via tele stroke with NIHSS of 5. CTH and CTA at outlying facility were reported as unremarkable. He was loaded with asa 325 mg then transferred here for stroke workup. Pain:  Pain Assessment  Pain Assessment: None - Denies Pain    Vision/ Hearing  Vision  Vision: Within Functional Limits  Hearing  Hearing: Within functional limits    Assessment:    Pt presents with moderate cognitive deficits characterized by difficulties with immediate and delayed recall and verbal resoning. Pt. Presents with no dysarthria, no O/M deficits at this time. ST to follow up and provide treatment to address noted deficits. Education provided. Further therapy recommended at discharge.      Recommendations:  Recommendations  Requires SLP Intervention: Yes  Patient Education: Yes  Patient Education Response: Verbalizes understanding   Frequency: 3-5x/week          Plan:   Speech Therapy Prognosis  Prognosis: Fair  Individuals consulted  Consulted and agree with results and recommendations: Patient    Goals:  Short Term Goals  Goal 1: Pt will recall 3-5 units with and without distraction with 90% accuracy  Goal 2: Pt will utilize memory strategies to aid in recall  Goal 3: Pt will complete verbal reasoning tasks
Functional Limits  Hearing  Hearing: Within functional limits    Cognition   Orientation  Overall Orientation Status: Within Functional Limits  Orientation Level: Oriented X4;Oriented to place;Oriented to time;Oriented to situation;Oriented to person  Cognition  Overall Cognitive Status: WFL     Objective           AROM RLE (degrees)  RLE AROM: WFL  AROM LLE (degrees)  LLE AROM : Exceptions  L Hip Flexion (0-125): 0-100  L Knee Flexion (0-145): 1-90  L Knee Extension (0): -45  L Ankle Dorsiflexion (0-20): -5  L Ankle Plantar Flexion (0-45): 5-10  AROM RUE (degrees)  RUE AROM : Exceptions  RUE General AROM: see OT  AROM LUE (degrees)  LUE AROM : Exceptions  LUE General AROM: see OT  Strength RLE  Strength RLE: WFL  Comment: 4/5 grossly  Strength LLE  Strength LLE: Exception  L Hip Flexion: 3-/5  L Knee Flexion: 3-/5  L Knee Extension: 3-/5  L Ankle Dorsiflexion: 2/5  L Ankle Plantar Flexion: 2/5  Strength RUE  Strength RUE: Exception  Comment: see OT  Strength LUE  Strength LUE: Exception  Comment: see OT           Bed mobility  Supine to Sit: Stand by assistance  Sit to Supine: Stand by assistance  Bed Mobility Comments: Pt given verbal cues to advance BLE  Transfers  Sit to Stand: Contact guard assistance  Stand to Sit: Contact guard assistance  Comment: Pt given verbal cues for hand placement  Ambulation  Surface: Level tile  Device: Rolling Walker  Assistance: Minimal assistance  Quality of Gait: Unstable uncoordinated foot placement with wide FREDA. Pt will increased trunk sway and heavy UE support  Gait Deviations: Staggers; Increased FREDA; Slow Carine  Distance: 20'     Balance  Sitting - Static: Good;-  Sitting - Dynamic: Good;-  Standing - Static: Fair;-  Standing - Dynamic: Fair;-  Comments: Pt sitting balance assesed sitting EOB, standing balance assesed during gait in RW               AM-PAC Score  AM-PAC Inpatient Mobility Raw Score : 19 (06/02/23 1200)  AM-PAC Inpatient T-Scale Score : 45.44 (06/02/23

## 2023-06-03 NOTE — DISCHARGE INSTRUCTIONS
You were admitted with weakness and were found to have a stroke on MRI. Please take Aspirin 81 mg daily indefinitely. Please continue to take Plavix 75 mg daily for 90 days. Please start taking Lipitor 80 mg nightly. Please start taking Norvasc 5 mg daily and follow with your PCP as soon as possible in 1 week after discharge to manage your dewey blood pressure. Please refrain from smoking. Please follow low-salt diet. Please take all the medications as prescribed and continue with Physical therapy and occupational therapy after discharge. Please follow up with neurologist in 1 month after discharge for post stroke follow up. Please follow up with your PCP for management of your high blood pressure.

## 2023-06-03 NOTE — CARE COORDINATION
Met with pt to assess for support and complete PHQ-9 screen. Pt lives in [place] with his wife. Stated he is a  and is gone for 3-4 weeks at a time. Stated his wife just started driving truck but she is local.  Pt stated they have a son that lives in [facility] and a dtr in [place]. Pt stated he has 3 grchildren. Pt reports family is supportive and help as needed. He stated his wife is here and will transport him home at discharge. 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 Pt denies any recent feelings of depression. Patient Health Questionnaire-9 (PHQ-9)    Over the last 2 weeks, how often have you been bothered by any of the following problems? 1. Little Interest or pleasure in doing things? [x] Not at all  [] Several Days  [] More than half the day  []  Nearly every day    2. Feeling down, depressed or hopeless? [x] Not at all  [] Several Days  [] More than half the day  []  Nearly every day    3. Trouble falling or staying asleep, or sleeping too much? [x] Not at all  [] Several Days  [] More than half the day  []  Nearly every day    4. Feeling tired or having little energy? [x] Not at all  [] Several Days  [] More than half the day  []  Nearly every day    5. Poor apettite or overeating? [x] Not at all  [] Several Days  [] More than half the day  []  Nearly every day    6. Feeling bad about yourself-or that you are a failure or have let yourself or your family down? [] Not at all  [] Several Days  [] More than half the day  []  Nearly every day    7. Trouble concentrating on things, such as reading the newspaper or watching television? [x] Not at all  [] Several Days  [] More than half the day  []  Nearly every day    8. Moving or speaking so slowly that other people could have noticed?  Or the opposite-being so fidgety or restless that you have been moving around a lot more than usual?   [x] Not at all  [] Several Days  [] More than half the day  []  Nearly every

## 2023-06-03 NOTE — DISCHARGE SUMMARY
Neurology Discharge Summary     Patient Identification:  Jonathan Dan is a 46 y.o. male. :  1970  Admit Date:  2023  Discharge date and time: No discharge date for patient encounter. Attending Provider: Harini Echevarria DO     Account Number: [id number]                                   Admission Diagnoses:   Lightheadedness [R42]  Weakness of left side of body [R53.1]  Stroke determined by clinical assessment [facility]) [I63.9]    Discharge Diagnoses:  Principal Problem:    Right pontine stroke [facility])  Active Problems:    Acute left hemiparesis ([facility] [address]  Resolved Problems:    * No resolved hospital problems. *      Discharge condition: Good    Consults:   rehabilitation medicine    Hospital Course:    46 y.o. male admitted on 2023 with concern for stroke. Patient is a  with H/O smoking (20 years, half a pack per day, stopped for 3 years and then resumed smoking last year), chronic HTN with medication non-compliance, experienced a sudden onset dizziness and left upper and lower extremity weakness while driving. He had to pull over because his dizziness was severe. He was evaluated via tele stroke with NIHSS of 5. CTH and CTA at outlying facility were reported as unremarkable. He was loaded with asa 325 mg then transferred here for stroke workup. LKW: 12.30 pm 2023  NIHSS: 5   Modified Melvin Scale: 0  CT head without contrast: unremarkable  TNK: not a candidate due to outside window   Endovascular: No LVO on CTA   MRI:  Acute lacunar infarct involving right paramedian akash    Patient was treated with ASA and Plavix and Lipitor. He underwent Echocardiogram with bubble study on 6/3/23, results pending. Permissive hypertension for 24 hours until . PM&R was consulted and recommended acute inpatient rehab once medically stable. Patient and his wife wants to go home today to [place]. Patient would like to  continue therapy after going home.      Today, patient is alert, awake,

## 2023-06-08 ENCOUNTER — HOSPITAL ENCOUNTER (EMERGENCY)
Age: 53
Discharge: HOME | End: 2023-06-08

## 2023-06-08 VITALS
RESPIRATION RATE: 17 BRPM | TEMPERATURE: 98.5 F | HEART RATE: 81 BPM | BODY MASS INDEX: 27.06 KG/M2 | SYSTOLIC BLOOD PRESSURE: 168 MMHG | HEIGHT: 70 IN | DIASTOLIC BLOOD PRESSURE: 106 MMHG | WEIGHT: 189 LBS | OXYGEN SATURATION: 96 %